# Patient Record
Sex: FEMALE | Race: BLACK OR AFRICAN AMERICAN | NOT HISPANIC OR LATINO
[De-identification: names, ages, dates, MRNs, and addresses within clinical notes are randomized per-mention and may not be internally consistent; named-entity substitution may affect disease eponyms.]

---

## 2022-01-24 ENCOUNTER — TRANSCRIPTION ENCOUNTER (OUTPATIENT)
Age: 22
End: 2022-01-24

## 2022-02-18 ENCOUNTER — APPOINTMENT (OUTPATIENT)
Dept: NEUROLOGY | Facility: CLINIC | Age: 22
End: 2022-02-18
Payer: MEDICAID

## 2022-02-18 VITALS — WEIGHT: 145 LBS | BODY MASS INDEX: 22.76 KG/M2 | HEIGHT: 67 IN

## 2022-02-18 DIAGNOSIS — Z86.69 PERSONAL HISTORY OF OTHER DISEASES OF THE NERVOUS SYSTEM AND SENSE ORGANS: ICD-10-CM

## 2022-02-18 DIAGNOSIS — Z78.9 OTHER SPECIFIED HEALTH STATUS: ICD-10-CM

## 2022-02-18 DIAGNOSIS — F84.0 AUTISTIC DISORDER: ICD-10-CM

## 2022-02-18 DIAGNOSIS — F95.8 OTHER TIC DISORDERS: ICD-10-CM

## 2022-02-18 PROCEDURE — 99204 OFFICE O/P NEW MOD 45 MIN: CPT

## 2022-02-18 NOTE — CONSULT LETTER
[Dear  ___] : Dear  [unfilled], [Consult Letter:] : I had the pleasure of evaluating your patient, [unfilled]. [Please see my note below.] : Please see my note below. [Consult Closing:] : Thank you very much for allowing me to participate in the care of this patient.  If you have any questions, please do not hesitate to contact me. [Sincerely,] : Sincerely, [FreeTextEntry3] : Esdras Davis M.D., Ph.D. DPN-N\par [facility]\par Neurology [facility] [place]\par Medical Director of Stroke Services\par [facility]\par

## 2022-02-18 NOTE — HISTORY OF PRESENT ILLNESS
[FreeTextEntry1] : Initial office visit February 18, 2022:\par This is a 21-year-old woman who presents today for neurologic evaluation. She has a history of autism spectrum disorder accompanied by motor tics. She's also had seizures in the past. Regarding the seizures she states that between 2015 2017 she had a total of 4 seizures. She had been placed on Keppra. She was taken off Keppra year after her last seizure was remained off medication for about 4 years and has remained seizure-free. Regarding her motor tics she has tics where she may raise her right shoulder ORIF touch from her head down to piece of paper. Her her parents they've been increasing and it in frequency. She did not have any of them during her visit today. When asked the patient states that little bit of a problem for her but not much. She is currently in school and hopes to graduate this year and find a job in either retail store or to restaurant as a . She is here today for neurologic evaluation.

## 2022-02-18 NOTE — PHYSICAL EXAM
[General Appearance - Alert] : alert [General Appearance - In No Acute Distress] : in no acute distress [General Appearance - Well Nourished] : well nourished [General Appearance - Well Developed] : well developed [Person] : oriented to person [Place] : oriented to place [Time] : oriented to time [Short Term Intact] : short term memory intact [Remote Intact] : remote memory intact [Registration Intact] : recent registration memory intact [Span Intact] : the attention span was normal [Concentration Intact] : normal concentrating ability [Visual Intact] : visual attention was ~T not ~L decreased [Naming Objects] : no difficulty naming common objects [Repeating Phrases] : no difficulty repeating a phrase [Fluency] : fluency intact [Comprehension] : comprehension intact [Current Events] : inadequate knowledge of current events [Past History] : adequate knowledge of personal past history [Cranial Nerves Optic (II)] : visual acuity intact bilaterally,  visual fields full to confrontation, pupils equal round and reactive to light [Cranial Nerves Oculomotor (III)] : extraocular motion intact [Cranial Nerves Trigeminal (V)] : facial sensation intact symmetrically [Cranial Nerves Facial (VII)] : face symmetrical [Cranial Nerves Vestibulocochlear (VIII)] : hearing was intact bilaterally [Cranial Nerves Glossopharyngeal (IX)] : tongue and palate midline [Cranial Nerves Accessory (XI - Cranial And Spinal)] : head turning and shoulder shrug symmetric [Cranial Nerves Hypoglossal (XII)] : there was no tongue deviation with protrusion [Motor Tone] : muscle tone was normal in all four extremities [Motor Strength] : muscle strength was normal in all four extremities [Involuntary Movements] : no involuntary movements were seen [No Muscle Atrophy] : normal bulk in all four extremities [Paresis Pronator Drift Right-Sided] : no pronator drift on the right [Paresis Pronator Drift Left-Sided] : no pronator drift on the left [Motor Strength Upper Extremities Bilaterally] : strength was normal in both upper extremities [Motor Strength Lower Extremities Bilaterally] : strength was normal in both lower extremities [Sensation Tactile Decrease] : light touch was intact [Sensation Pain / Temperature Decrease] : pain and temperature was intact [Sensation Vibration Decrease] : vibration was intact [Proprioception] : proprioception was intact [Abnormal Walk] : normal gait [Balance] : balance was intact [Tremor] : no tremor present [Coordination - Dysmetria Impaired Finger-to-Nose Bilateral] : not present [2+] : Patella left 2+ [FreeTextEntry4] : 2/3 recall [Sclera] : the sclera and conjunctiva were normal [PERRL With Normal Accommodation] : pupils were equal in size, round, reactive to light, with normal accommodation [Extraocular Movements] : extraocular movements were intact [No APD] : no afferent pupillary defect [No TING] : no internuclear ophthalmoplegia [Full Visual Field] : full visual field

## 2022-02-18 NOTE — ASSESSMENT
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 She has no clear motor deficits and her parents actually is an avid bicyclist and right-sided 50 mile races. I would not treat her motor tics at this time it does not appear to be overly troublesome to her. I did also advise the parents that I am not a specialist in autism and that this patient would be better served by finding a neurologist who specializes in adults with autism spectrum disorder for continued care. I would not restart a seizure medicine at this time issues remain seizure-free off medication for 4 years but did mention to them that should she have another seizure she'll likely need to be placed on seizure medication. At this time be available for reconsultation as needed but as stated earlier I think she would be better served by a neurologist with more experience in adults it with autism spectrum disorder.

## 2022-03-22 ENCOUNTER — APPOINTMENT (OUTPATIENT)
Dept: NEUROLOGY | Facility: CLINIC | Age: 22
End: 2022-03-22
Payer: MEDICAID

## 2022-03-22 ENCOUNTER — NON-APPOINTMENT (OUTPATIENT)
Age: 22
End: 2022-03-22

## 2022-03-22 PROCEDURE — 95819 EEG AWAKE AND ASLEEP: CPT

## 2022-03-22 PROCEDURE — 93040 RHYTHM ECG WITH REPORT: CPT
